# Patient Record
Sex: MALE | Race: WHITE
[De-identification: names, ages, dates, MRNs, and addresses within clinical notes are randomized per-mention and may not be internally consistent; named-entity substitution may affect disease eponyms.]

---

## 2019-09-11 ENCOUNTER — HOSPITAL ENCOUNTER (EMERGENCY)
Dept: HOSPITAL 56 - MW.ED | Age: 59
Discharge: HOME | End: 2019-09-11
Payer: COMMERCIAL

## 2019-09-11 DIAGNOSIS — I10: Primary | ICD-10-CM

## 2019-09-11 DIAGNOSIS — Z88.0: ICD-10-CM

## 2019-09-11 LAB
BUN SERPL-MCNC: 20 MG/DL (ref 7–18)
CHLORIDE SERPL-SCNC: 105 MMOL/L (ref 98–107)
CO2 SERPL-SCNC: 28.8 MMOL/L (ref 21–32)
GLUCOSE SERPL-MCNC: 96 MG/DL (ref 74–106)
POTASSIUM SERPL-SCNC: 4.3 MMOL/L (ref 3.5–5.1)
SODIUM SERPL-SCNC: 143 MMOL/L (ref 136–148)

## 2019-09-11 PROCEDURE — 36415 COLL VENOUS BLD VENIPUNCTURE: CPT

## 2019-09-11 PROCEDURE — 93005 ELECTROCARDIOGRAM TRACING: CPT

## 2019-09-11 PROCEDURE — 99284 EMERGENCY DEPT VISIT MOD MDM: CPT

## 2019-09-11 PROCEDURE — 96374 THER/PROPH/DIAG INJ IV PUSH: CPT

## 2019-09-11 PROCEDURE — 70450 CT HEAD/BRAIN W/O DYE: CPT

## 2019-09-11 PROCEDURE — 85025 COMPLETE CBC W/AUTO DIFF WBC: CPT

## 2019-09-11 PROCEDURE — 71045 X-RAY EXAM CHEST 1 VIEW: CPT

## 2019-09-11 PROCEDURE — 80053 COMPREHEN METABOLIC PANEL: CPT

## 2019-09-11 PROCEDURE — 84484 ASSAY OF TROPONIN QUANT: CPT

## 2019-09-11 NOTE — CR
INDICATION:



Chest pain. Shortness of breath.



COMPARISON:



none



TECHNIQUE:



Portable AP erect chest performed at 9:16 a.m. 



FINDINGS:



The lungs are clear. There is no evidence of pneumothorax. The heart, 

mediastinum and pulmonary vessels are of normal size. There is no evidence 

of pleural fluid.



IMPRESSION:



Negative chest.



Dictated by Tirso Syed MD @ Sep 11 2019  9:26AM



Signed by Dr. Tirso Syed @ Sep 11 2019  9:27AM

## 2019-09-11 NOTE — EDM.PDOC
ED HPI GENERAL MEDICAL PROBLEM





- General


Chief Complaint: Cardiovascular Problem


Stated Complaint: DIZZY


Time Seen by Provider: 09/11/19 08:57


Source of Information: Reports: Patient


History Limitations: Reports: No Limitations





- History of Present Illness


INITIAL COMMENTS - FREE TEXT/NARRATIVE: 


History of present illness:


[]Patient started having some lightheaded dizziness yesterday that has 

continued today. He has a frontal headache denies any trauma, change in vision, 

nausea, vomiting, numbness, tingling, chest pain or shortness of breath. 

Patient states he may have missed a blood pressure dose of medication 2 days 

ago. Took his blood pressure yesterday when he had symptoms and he noted that 

it was 120;s/ 80's.





Review of systems: 


As per history of present illness and below otherwise all systems reviewed and 

negative.





Past medical history: 


As per history of present illness and as reviewed below otherwise 

noncontributory.





Surgical history: 


As per history of present illness and as reviewed below otherwise 

noncontributory.





Social history: 


No reported history of drug or alcohol abuse.





Family history: 


As per history of present illness and as reviewed below otherwise 

noncontributory.





Physical exam:


General: Well developed, well nourished in NAD


HEENT: Atraumatic, normocephalic, pupils reactive, negative for conjunctival 

pallor or scleral icterus, mucous membranes moist, throat clear, neck supple, 

nontender, trachea midline.


Lungs: Clear to auscultation, breath sounds equal bilaterally, chest nontender.


Heart: S1S2, regular, negative for clicks, rubs, or JVD.


Abdomen: NABS, Soft, nondistended, nontender. Negative for masses or 

hepatosplenomegaly. Negative for costovertebral tenderness.


Pelvis: Stable nontender.


Genitourinary: Deferred.


Rectal: Deferred.


Extremities: Atraumatic, negative for cords or calf pain. Neurovascular 

unremarkable.


Neuro: Awake, alert, oriented. Cranial nerves II through XII unremarkable. 

Cerebellum unremarkable. Motor and sensory unremarkable throughout. Exam 

nonfocal.


Skin:warm and dry





Diagnostics:


CT head, EKG, CBC, chemistry, troponin





Therapeutics:


Labetalol





ED Course:


improved, sx resolved with decrease in BP, Patient ambulated without symptoms.





Impression: 


Uncontrolled hypertension





Prescriptions:


none





Plan:


Take meds as directed, follow up with your primary care physician, return to ER 

if symptoms worsen or change.





Definitive disposition and diagnosis as appropriate pending reevaluation and 

review of above.








- Related Data


 Allergies











Allergy/AdvReac Type Severity Reaction Status Date / Time


 


Penicillins Allergy  Rash Verified 09/11/19 08:59














Past Medical History


Cardiovascular History: Reports: High Cholesterol, Hypertension


Respiratory History: Reports: Asthma





- Infectious Disease History


Infectious Disease History: Reports: Chicken Pox





- Past Surgical History


Musculoskeletal Surgical History: Reports: Arthroscopic Procedure





Social & Family History





- Family History


Family Medical History: Noncontributory





- Tobacco Use


Smoking Status *Q: Never Smoker


Second Hand Smoke Exposure: No





- Caffeine Use


Caffeine Use: Reports: Coffee





- Recreational Drug Use


Recreational Drug Use: No





ED ROS GENERAL





- Review of Systems


Review Of Systems: See Below





ED EXAM, GENERAL





- Physical Exam


Exam: See Below





Course





- Vital Signs


Last Recorded V/S: 


 Last Vital Signs











Temp  96.9 F   09/11/19 08:59


 


Pulse  62   09/11/19 10:31


 


Resp  18   09/11/19 10:31


 


BP  154/102 H  09/11/19 10:31


 


Pulse Ox  96   09/11/19 10:31














- Orders/Labs/Meds


Orders: 


 Active Orders 24 hr











 Category Date Time Status


 


 Cardiac Monitoring [RC] .AS DIRECTED Care  09/11/19 09:05 Active


 


 EKG Documentation Completion [RC] STAT Care  09/11/19 09:05 Active


 


 Sodium Chloride 0.9% [Saline Flush] Med  09/11/19 09:06 Active





 10 ml FLUSH ASDIRECTED PRN   


 


 Sodium Chloride 0.9% [Saline Flush] Med  09/11/19 09:06 Active





 2.5 ml FLUSH ASDIRECTED PRN   


 


 Saline Lock Insert [OM.PC] Stat Oth  09/11/19 09:05 Ordered








 Medication Orders





Sodium Chloride (Saline Flush)  10 ml FLUSH ASDIRECTED PRN


   PRN Reason: Keep Vein Open


   Last Admin: 09/11/19 09:12  Dose: 10 ml


Sodium Chloride (Saline Flush)  2.5 ml FLUSH ASDIRECTED PRN


   PRN Reason: Keep Vein Open


   Last Admin: 09/11/19 09:12  Dose: 2.5 ml








Labs: 


 Laboratory Tests











  09/11/19 09/11/19 Range/Units





  09:00 09:00 


 


WBC  8.16   (4.0-11.0)  K/uL


 


RBC  5.17   (4.50-5.90)  M/uL


 


Hgb  16.2   (13.0-17.0)  g/dL


 


Hct  47.8   (38.0-50.0)  %


 


MCV  92.5   (80.0-98.0)  fL


 


MCH  31.3   (27.0-32.0)  pg


 


MCHC  33.9   (31.0-37.0)  g/dL


 


RDW Std Deviation  45.5   (28.0-62.0)  fl


 


RDW Coeff of Bud  13   (11.0-15.0)  %


 


Plt Count  266   (150-400)  K/uL


 


MPV  10.50   (7.40-12.00)  fL


 


Neut % (Auto)  61.2   (48.0-80.0)  %


 


Lymph % (Auto)  19.9   (16.0-40.0)  %


 


Mono % (Auto)  12.3   (0.0-15.0)  %


 


Eos % (Auto)  6.1   (0.0-7.0)  %


 


Baso % (Auto)  0.5   (0.0-1.5)  %


 


Neut # (Auto)  5.0   (1.4-5.7)  K/uL


 


Lymph # (Auto)  1.6   (0.6-2.4)  K/uL


 


Mono # (Auto)  1.0 H   (0.0-0.8)  K/uL


 


Eos # (Auto)  0.5   (0.0-0.7)  K/uL


 


Baso # (Auto)  0.0   (0.0-0.1)  K/uL


 


Nucleated RBC %  0.0   /100WBC


 


Nucleated RBCs #  0   K/uL


 


Sodium   143  (136-148)  mmol/L


 


Potassium   4.3  (3.5-5.1)  mmol/L


 


Chloride   105  ()  mmol/L


 


Carbon Dioxide   28.8  (21.0-32.0)  mmol/L


 


BUN   20 H  (7.0-18.0)  mg/dL


 


Creatinine   1.0  (0.8-1.3)  mg/dL


 


Est Cr Clr Drug Dosing   83.14  mL/min


 


Estimated GFR (MDRD)   > 60.0  ml/min


 


Glucose   96  ()  mg/dL


 


Calcium   9.0  (8.5-10.1)  mg/dL


 


Total Bilirubin   0.4  (0.2-1.0)  mg/dL


 


AST   33  (15-37)  IU/L


 


ALT   43  (14-63)  IU/L


 


Alkaline Phosphatase   88  ()  U/L


 


Troponin I   < 0.050  (0.000-0.056)  ng/mL


 


Total Protein   7.7  (6.4-8.2)  g/dL


 


Albumin   3.9  (3.4-5.0)  g/dL


 


Globulin   3.8  (2.6-4.0)  g/dL


 


Albumin/Globulin Ratio   1.0  (0.9-1.6)  











Meds: 


Medications











Generic Name Dose Route Start Last Admin





  Trade Name Freq  PRN Reason Stop Dose Admin


 


Sodium Chloride  10 ml  09/11/19 09:06  09/11/19 09:12





  Saline Flush  FLUSH   10 ml





  ASDIRECTED PRN   Administration





  Keep Vein Open   





     





     





     


 


Sodium Chloride  2.5 ml  09/11/19 09:06  09/11/19 09:12





  Saline Flush  FLUSH   2.5 ml





  ASDIRECTED PRN   Administration





  Keep Vein Open   





     





     





     














Discontinued Medications














Generic Name Dose Route Start Last Admin





  Trade Name Freq  PRN Reason Stop Dose Admin


 


Labetalol HCl  20 mg  09/11/19 09:06  09/11/19 09:12





  Normodyne  IVPUSH  09/11/19 09:07  4 ml





  ONETIME ONE   Administration





     





     





  Protocol   





     














Departure





- Departure


Time of Disposition: 10:36


Disposition: Home, Self-Care 01


Condition: Good


Clinical Impression: 


 Uncontrolled hypertension





Referrals: 


PCP,Unknown [Primary Care Provider] - 


Forms:  ED Department Discharge


Additional Instructions: 


The following information is given to patients seen in the emergency department 

who are being discharged to home. This information is to outline your options 

for follow-up care. We provide all patients seen in our emergency department 

with a follow-up referral.





The need for follow-up, as well as the timing and circumstances, are variable 

depending upon the specifics of your emergency department visit.





If you don't have a primary care physician on staff, we will provide you with a 

referral. We always advise you to contact your personal physician following an 

emergency department visit to inform them of the circumstance of the visit and 

for follow-up with them and/or the need for any referrals to a consulting 

specialist.





The emergency department will also refer you to a specialist when appropriate. 

This referral assures that you have the opportunity for follow-up care with a 

specialist. All of these measure are taken in an effort to provide you with 

optimal care, which includes your follow-up.





Under all circumstances we always encourage you to contact your private 

physician who remains a resource for coordinating your care. When calling for 

follow-up care, please make the office aware that this follow-up is from your 

recent emergency room visit. If for any reason you are refused follow-up, 

please contact the [facility] Emergency 

Department at [phone] and asked to speak to the emergency department 

charge nurse.





Follow-up with primary care as week as previously scheduled.





[facility]


Primary Care


[address] [place] [id number]


Phone: [phone]


Fax: [phone]





- My Orders


Last 24 Hours: 


My Active Orders





09/11/19 09:05


Cardiac Monitoring [RC] .AS DIRECTED 


EKG Documentation Completion [RC] STAT 


Saline Lock Insert [OM.PC] Stat 





09/11/19 09:06


Sodium Chloride 0.9% [Saline Flush]   10 ml FLUSH ASDIRECTED PRN 


Sodium Chloride 0.9% [Saline Flush]   2.5 ml FLUSH ASDIRECTED PRN 














- Assessment/Plan


Last 24 Hours: 


My Active Orders





09/11/19 09:05


Cardiac Monitoring [RC] .AS DIRECTED 


EKG Documentation Completion [RC] STAT 


Saline Lock Insert [OM.PC] Stat 





09/11/19 09:06


Sodium Chloride 0.9% [Saline Flush]   10 ml FLUSH ASDIRECTED PRN 


Sodium Chloride 0.9% [Saline Flush]   2.5 ml FLUSH ASDIRECTED PRN

## 2019-09-11 NOTE — CT
INDICATION:



Dizziness and nausea which started yesterday.



TECHNIQUE:



Scanning of the head was performed without IV contrast material. Coronal 

sagittal reconstructions were obtained.



COMPARISON:



None.



FINDINGS:



No acute hemorrhage, parenchymal attenuation abnormality, or mass effect is 

demonstrated. Differentiation between the gray matter and white matter is 

preserved. 



The ventricles and other subarachnoid spaces are within normal limits. 



No calvarial abnormality is evident. 



Nodular membrane thickening is present in both maxillary and ethmoid 

sinuses. Nasal polyps are noted bilaterally. The frontal and sphenoid 

sinuses are clear as are the mastoids. Mild leftward deviation of the mid 

nasal septum is noted with a large left-sided nasal septal spur. 



IMPRESSION:



1. Negative noncontrast head CT.



2. Nasal polyposis as well as nodular membrane thickening in both maxillary 

and ethmoid sinuses.



3. Mild leftward deviation of the mid nasal septum and large left-sided 

nasal septal spur.



Please note that all CT scans at this facility use dose modulation, 

iterative reconstruction, and/or weight-based dosing when appropriate to 

reduce radiation dose to as low as reasonably achievable.



Dictated by George Red MD @ Sep 11 2019  9:41AM



Signed by Dr. George Red @ Sep 11 2019  9:47AM